# Patient Record
Sex: MALE | Race: WHITE | Employment: UNEMPLOYED | URBAN - METROPOLITAN AREA
[De-identification: names, ages, dates, MRNs, and addresses within clinical notes are randomized per-mention and may not be internally consistent; named-entity substitution may affect disease eponyms.]

---

## 2017-06-27 ENCOUNTER — HOSPITAL ENCOUNTER (OUTPATIENT)
Age: 27
Discharge: HOME | End: 2017-06-27
Payer: MEDICAID

## 2017-06-27 VITALS
HEART RATE: 63 BPM | OXYGEN SATURATION: 97 % | SYSTOLIC BLOOD PRESSURE: 123 MMHG | RESPIRATION RATE: 18 BRPM | DIASTOLIC BLOOD PRESSURE: 60 MMHG | TEMPERATURE: 99 F

## 2017-06-27 DIAGNOSIS — H61.22 IMPACTED CERUMEN OF LEFT EAR: ICD-10-CM

## 2017-06-27 DIAGNOSIS — H60.502 ACUTE OTITIS EXTERNA OF LEFT EAR, UNSPECIFIED TYPE: Primary | ICD-10-CM

## 2017-06-27 PROCEDURE — 99214 OFFICE O/P EST MOD 30 MIN: CPT

## 2017-06-27 PROCEDURE — 69210 REMOVE IMPACTED EAR WAX UNI: CPT

## 2017-06-27 PROCEDURE — 99213 OFFICE O/P EST LOW 20 MIN: CPT

## 2017-06-27 RX ORDER — CIPROFLOXACIN AND DEXAMETHASONE 3; 1 MG/ML; MG/ML
4 SUSPENSION/ DROPS AURICULAR (OTIC) 2 TIMES DAILY
Qty: 1 BOTTLE | Refills: 0 | Status: SHIPPED | OUTPATIENT
Start: 2017-06-27 | End: 2017-07-04

## 2017-06-27 NOTE — ED PROVIDER NOTES
Patient Seen in: THE [facility] END    History   Patient presents with:  Ear Wax    Stated Complaint: ear clogged x 3 days    HPI    Sheldon Dewey is a 19-year-old male who comes in today complaining of 4 days of left ear clogged sensation.   He watery discharge; no sinus tenderness  Throat:  Lips, tongue, and mucosa are moist, pink, and intact; posterior pharynx without exudate or erythema. No trismus or stridor, uvula midline.    Neck:  Supple; symmetrical, trachea midline, no adenopathy  Lungs: into the left ear 2 (two) times daily.   Qty: 1 Bottle Refills: 0  Associated Diagnoses:Acute otitis externa of left ear, unspecified type      I have given the patient instructions regarding his diagnosis, expectations, follow up, and return to the ER prec

## 2017-06-28 NOTE — ED NOTES
Received phone call from [facility] stating Ciprodex not covered by insurance. Cortisporin is covered. Dr Tyrell Roberts informed and medication was changed.

## 2020-11-09 PROBLEM — F32.A DEPRESSIVE DISORDER: Status: ACTIVE | Noted: 2019-09-27

## 2021-01-29 ENCOUNTER — LAB ENCOUNTER (OUTPATIENT)
Dept: LAB | Age: 31
End: 2021-01-29
Attending: INTERNAL MEDICINE
Payer: COMMERCIAL

## 2021-01-29 DIAGNOSIS — J06.9 URI WITH COUGH AND CONGESTION: ICD-10-CM

## 2021-01-30 LAB — SARS-COV-2 RNA RESP QL NAA+PROBE: NOT DETECTED

## 2021-11-17 NOTE — ED INITIAL ASSESSMENT (HPI)
C/O left ear feels clogged for 3 days. Instilled OTC ear wax softener-no relief. Azathioprine Counseling:  I discussed with the patient the risks of azathioprine including but not limited to myelosuppression, immunosuppression, hepatotoxicity, lymphoma, and infections.  The patient understands that monitoring is required including baseline LFTs, Creatinine, possible TPMP genotyping and weekly CBCs for the first month and then every 2 weeks thereafter.  The patient verbalized understanding of the proper use and possible adverse effects of azathioprine.  All of the patient's questions and concerns were addressed.

## 2022-09-01 ENCOUNTER — APPOINTMENT (OUTPATIENT)
Dept: URBAN - METROPOLITAN AREA CLINIC 242 | Age: 32
Setting detail: DERMATOLOGY
End: 2022-09-01

## 2022-09-01 DIAGNOSIS — L30.4 ERYTHEMA INTERTRIGO: ICD-10-CM

## 2022-09-01 PROCEDURE — OTHER COUNSELING: OTHER

## 2022-09-01 PROCEDURE — OTHER PRESCRIPTION: OTHER

## 2022-09-01 PROCEDURE — OTHER PRESCRIPTION MEDICATION MANAGEMENT: OTHER

## 2022-09-01 PROCEDURE — 99213 OFFICE O/P EST LOW 20 MIN: CPT

## 2022-09-01 RX ORDER — KETOCONAZOLE 20 MG/G
CREAM TOPICAL
Qty: 15 | Refills: 0 | Status: ERX | COMMUNITY
Start: 2022-09-01

## 2022-09-01 RX ORDER — TRIAMCINOLONE ACETONIDE 1 MG/G
CREAM TOPICAL
Qty: 15 | Refills: 0 | Status: ERX | COMMUNITY
Start: 2022-09-01

## 2022-09-01 ASSESSMENT — LOCATION ZONE DERM: LOCATION ZONE: LIP

## 2022-09-01 ASSESSMENT — LOCATION SIMPLE DESCRIPTION DERM: LOCATION SIMPLE: RIGHT LIP

## 2022-09-01 ASSESSMENT — LOCATION DETAILED DESCRIPTION DERM: LOCATION DETAILED: RIGHT NASOLABIAL FOLD

## 2022-09-01 NOTE — PROCEDURE: PRESCRIPTION MEDICATION MANAGEMENT
Render In Strict Bullet Format?: No
Initiate Treatment: TAC bid, Ketoconazole cream bid
Detail Level: Zone

## 2023-07-19 ENCOUNTER — APPOINTMENT (RX ONLY)
Dept: URBAN - METROPOLITAN AREA CLINIC 330 | Facility: CLINIC | Age: 33
Setting detail: DERMATOLOGY
End: 2023-07-19

## 2023-07-19 DIAGNOSIS — K13.0 DISEASES OF LIPS: ICD-10-CM | Status: IMPROVED

## 2023-07-19 DIAGNOSIS — L21.8 OTHER SEBORRHEIC DERMATITIS: ICD-10-CM

## 2023-07-19 DIAGNOSIS — L259 CONTACT DERMATITIS AND OTHER ECZEMA, UNSPECIFIED CAUSE: ICD-10-CM

## 2023-07-19 PROBLEM — L30.8 OTHER SPECIFIED DERMATITIS: Status: ACTIVE | Noted: 2023-07-19

## 2023-07-19 PROCEDURE — ? PRESCRIPTION MEDICATION MANAGEMENT

## 2023-07-19 PROCEDURE — ? PRESCRIPTION

## 2023-07-19 PROCEDURE — 99204 OFFICE O/P NEW MOD 45 MIN: CPT

## 2023-07-19 PROCEDURE — ? MEDICATION COUNSELING

## 2023-07-19 PROCEDURE — ? COUNSELING

## 2023-07-19 RX ORDER — PIMECROLIMUS 10 MG/G
CREAM TOPICAL
Qty: 30 | Refills: 2 | Status: ERX | COMMUNITY
Start: 2023-07-19

## 2023-07-19 RX ORDER — KETOCONAZOLE 20 MG/G
CREAM TOPICAL
Qty: 30 | Refills: 3 | Status: ERX | COMMUNITY
Start: 2023-07-19

## 2023-07-19 RX ORDER — TRIAMCINOLONE ACETONIDE 1 MG/G
CREAM TOPICAL BID
Qty: 80 | Refills: 2 | Status: ERX | COMMUNITY
Start: 2023-07-19

## 2023-07-19 RX ADMIN — PIMECROLIMUS: 10 CREAM TOPICAL at 00:00

## 2023-07-19 RX ADMIN — KETOCONAZOLE: 20 CREAM TOPICAL at 00:00

## 2023-07-19 RX ADMIN — TRIAMCINOLONE ACETONIDE: 1 CREAM TOPICAL at 00:00

## 2023-07-19 ASSESSMENT — LOCATION ZONE DERM
LOCATION ZONE: FACE
LOCATION ZONE: ARM
LOCATION ZONE: LIP

## 2023-07-19 ASSESSMENT — LOCATION DETAILED DESCRIPTION DERM
LOCATION DETAILED: LEFT INFERIOR VERMILION LIP
LOCATION DETAILED: LEFT SUPERIOR VERMILION LIP
LOCATION DETAILED: RIGHT SUPERIOR MEDIAL BUCCAL CHEEK
LOCATION DETAILED: LEFT VENTRAL PROXIMAL FOREARM

## 2023-07-19 ASSESSMENT — SEVERITY ASSESSMENT 2020: SEVERITY 2020: MILD

## 2023-07-19 ASSESSMENT — LOCATION SIMPLE DESCRIPTION DERM
LOCATION SIMPLE: LEFT LIP
LOCATION SIMPLE: LEFT FOREARM
LOCATION SIMPLE: RIGHT CHEEK

## 2023-07-19 ASSESSMENT — SEVERITY ASSESSMENT: HOW SEVERE IS THIS PATIENT'S CONDITION?: MILD

## 2023-07-19 NOTE — PROCEDURE: PRESCRIPTION MEDICATION MANAGEMENT
Render In Strict Bullet Format?: No
Initiate Treatment: Mix Ketoconazole/ Tmc cream 50/50 and apply to lips bid for two weeks, use only as needed when cracked and irritated
Detail Level: Zone
Plan: Pt only needs to use creams as needed\\nPt and mom advised that this condition can be caused by a multitude of things\\nLast flare several weeks ago. Clear today.
Plan: Discussed chronic condition that we can help manage.
Initiate Treatment: Ketoconazole cream bid prn \\nElidel cream bid prn
Detail Level: Simple
Initiate Treatment: Can use Tmc if needed

## 2025-01-14 SDOH — HEALTH STABILITY: PHYSICAL HEALTH: ON AVERAGE, HOW MANY DAYS PER WEEK DO YOU ENGAGE IN MODERATE TO STRENUOUS EXERCISE (LIKE A BRISK WALK)?: 4 DAYS

## 2025-01-14 SDOH — HEALTH STABILITY: PHYSICAL HEALTH: ON AVERAGE, HOW MANY MINUTES DO YOU ENGAGE IN EXERCISE AT THIS LEVEL?: 50 MIN

## 2025-01-15 ENCOUNTER — OFFICE VISIT (OUTPATIENT)
Dept: PRIMARY CARE CLINIC | Facility: CLINIC | Age: 35
End: 2025-01-15
Payer: COMMERCIAL

## 2025-01-15 VITALS
WEIGHT: 168 LBS | HEIGHT: 68 IN | TEMPERATURE: 98 F | HEART RATE: 85 BPM | DIASTOLIC BLOOD PRESSURE: 74 MMHG | BODY MASS INDEX: 25.46 KG/M2 | SYSTOLIC BLOOD PRESSURE: 118 MMHG | OXYGEN SATURATION: 98 % | RESPIRATION RATE: 16 BRPM

## 2025-01-15 DIAGNOSIS — N50.82 ACUTE PAIN IN SCROTUM: Primary | ICD-10-CM

## 2025-01-15 DIAGNOSIS — N50.82 ACUTE PAIN IN SCROTUM: ICD-10-CM

## 2025-01-15 LAB
ALBUMIN SERPL-MCNC: 4.7 G/DL (ref 3.5–5)
ALBUMIN/GLOB SERPL: 1.7 (ref 1–1.9)
ALP SERPL-CCNC: 61 U/L (ref 40–129)
ALT SERPL-CCNC: 22 U/L (ref 8–55)
ANION GAP SERPL CALC-SCNC: 11 MMOL/L (ref 7–16)
APPEARANCE UR: CLEAR
AST SERPL-CCNC: 20 U/L (ref 15–37)
BACTERIA URNS QL MICRO: NEGATIVE /HPF
BASOPHILS # BLD: 0.05 K/UL (ref 0–0.2)
BASOPHILS NFR BLD: 1 % (ref 0–2)
BILIRUB SERPL-MCNC: 0.6 MG/DL (ref 0–1.2)
BILIRUB UR QL: NEGATIVE
BUN SERPL-MCNC: 14 MG/DL (ref 6–23)
CALCIUM SERPL-MCNC: 9.9 MG/DL (ref 8.8–10.2)
CASTS URNS QL MICRO: 0 /LPF
CHLORIDE SERPL-SCNC: 101 MMOL/L (ref 98–107)
CO2 SERPL-SCNC: 29 MMOL/L (ref 20–29)
COLOR UR: NORMAL
CREAT SERPL-MCNC: 0.81 MG/DL (ref 0.8–1.3)
CRYSTALS URNS QL MICRO: 0 /LPF
DIFFERENTIAL METHOD BLD: NORMAL
EOSINOPHIL # BLD: 0.15 K/UL (ref 0–0.8)
EOSINOPHIL NFR BLD: 2.9 % (ref 0.5–7.8)
EPI CELLS #/AREA URNS HPF: NORMAL /HPF (ref 0–5)
ERYTHROCYTE [DISTWIDTH] IN BLOOD BY AUTOMATED COUNT: 12.2 % (ref 11.9–14.6)
GLOBULIN SER CALC-MCNC: 2.7 G/DL (ref 2.3–3.5)
GLUCOSE SERPL-MCNC: 91 MG/DL (ref 70–99)
GLUCOSE UR STRIP.AUTO-MCNC: NEGATIVE MG/DL
HCT VFR BLD AUTO: 47.4 % (ref 41.1–50.3)
HGB BLD-MCNC: 16 G/DL (ref 13.6–17.2)
HGB UR QL STRIP: NEGATIVE
HYALINE CASTS URNS QL MICRO: NORMAL /LPF
IMM GRANULOCYTES # BLD AUTO: 0.01 K/UL (ref 0–0.5)
IMM GRANULOCYTES NFR BLD AUTO: 0.2 % (ref 0–5)
KETONES UR QL STRIP.AUTO: NEGATIVE MG/DL
LEUKOCYTE ESTERASE UR QL STRIP.AUTO: NEGATIVE
LYMPHOCYTES # BLD: 1.37 K/UL (ref 0.5–4.6)
LYMPHOCYTES NFR BLD: 26 % (ref 13–44)
MCH RBC QN AUTO: 31.3 PG (ref 26.1–32.9)
MCHC RBC AUTO-ENTMCNC: 33.8 G/DL (ref 31.4–35)
MCV RBC AUTO: 92.6 FL (ref 82–102)
MONOCYTES # BLD: 0.47 K/UL (ref 0.1–1.3)
MONOCYTES NFR BLD: 8.9 % (ref 4–12)
MUCOUS THREADS URNS QL MICRO: 0 /LPF
NEUTS SEG # BLD: 3.21 K/UL (ref 1.7–8.2)
NEUTS SEG NFR BLD: 61 % (ref 43–78)
NITRITE UR QL STRIP.AUTO: NEGATIVE
NRBC # BLD: 0 K/UL (ref 0–0.2)
PH UR STRIP: 5.5 (ref 5–9)
PLATELET # BLD AUTO: 227 K/UL (ref 150–450)
PMV BLD AUTO: 11 FL (ref 9.4–12.3)
POTASSIUM SERPL-SCNC: 4.6 MMOL/L (ref 3.5–5.1)
PROT SERPL-MCNC: 7.4 G/DL (ref 6.3–8.2)
PROT UR STRIP-MCNC: NEGATIVE MG/DL
RBC # BLD AUTO: 5.12 M/UL (ref 4.23–5.6)
RBC #/AREA URNS HPF: NORMAL /HPF (ref 0–5)
SODIUM SERPL-SCNC: 141 MMOL/L (ref 136–145)
SP GR UR REFRACTOMETRY: 1.01 (ref 1–1.02)
URINE CULTURE IF INDICATED: NORMAL
UROBILINOGEN UR QL STRIP.AUTO: 0.2 EU/DL (ref 0.2–1)
WBC # BLD AUTO: 5.3 K/UL (ref 4.3–11.1)
WBC URNS QL MICRO: NORMAL /HPF (ref 0–4)

## 2025-01-15 PROCEDURE — 99203 OFFICE O/P NEW LOW 30 MIN: CPT | Performed by: NURSE PRACTITIONER

## 2025-01-15 SDOH — ECONOMIC STABILITY: FOOD INSECURITY: WITHIN THE PAST 12 MONTHS, YOU WORRIED THAT YOUR FOOD WOULD RUN OUT BEFORE YOU GOT MONEY TO BUY MORE.: NEVER TRUE

## 2025-01-15 SDOH — ECONOMIC STABILITY: FOOD INSECURITY: WITHIN THE PAST 12 MONTHS, THE FOOD YOU BOUGHT JUST DIDN'T LAST AND YOU DIDN'T HAVE MONEY TO GET MORE.: NEVER TRUE

## 2025-01-15 ASSESSMENT — PATIENT HEALTH QUESTIONNAIRE - PHQ9
SUM OF ALL RESPONSES TO PHQ QUESTIONS 1-9: 0
2. FEELING DOWN, DEPRESSED OR HOPELESS: NOT AT ALL
1. LITTLE INTEREST OR PLEASURE IN DOING THINGS: NOT AT ALL
SUM OF ALL RESPONSES TO PHQ9 QUESTIONS 1 & 2: 0
SUM OF ALL RESPONSES TO PHQ QUESTIONS 1-9: 0

## 2025-01-15 NOTE — PROGRESS NOTES
Verified:   Current meds/immunizations reviewed, including purpose with patient. Medication Reconciliation list given to patient/family. Patient advised to discard old medication lists and provide all providers with current list at each visit and carry list with them in case of emergency.    Return in about 3 months (around 4/15/2025) for Annual PE.        JNEAE Sellers - CNP    Please forgive punctuation, typographic/voice recognition errors. Portions of this note were created using voice recognition software. Despite my efforts to edit grammatical and transcription errors, bizarre and nonsensical sentences may still exist.

## 2025-01-15 NOTE — ASSESSMENT & PLAN NOTE
patient has requested referral to urology.  Advised that we will proceed with workup and refer if indicated.  Ultrasound ordered.  Labs today.  May require referral if unable to find source of pain

## 2025-01-16 ENCOUNTER — TELEPHONE (OUTPATIENT)
Dept: PRIMARY CARE CLINIC | Facility: CLINIC | Age: 35
End: 2025-01-16

## 2025-01-16 LAB
C TRACH RRNA SPEC QL NAA+PROBE: NEGATIVE
N GONORRHOEA RRNA SPEC QL NAA+PROBE: NEGATIVE
SPECIMEN SOURCE: NORMAL
T VAGINALIS RRNA SPEC QL NAA+PROBE: NEGATIVE

## 2025-01-16 NOTE — RESULT ENCOUNTER NOTE
Please advise Vick that his labs are healthy at this time.  Any labs not addressed that are outside the normal range are not clinically important at this time.   Continue current treatment plan, follow up as scheduled.   Thanks,   Grzegorz Herrera

## 2025-01-16 NOTE — TELEPHONE ENCOUNTER
----- Message from Grzegorz Herrera, JENAE - CNP sent at 1/16/2025  8:32 AM EST -----  Please advise Vick that his labs are healthy at this time.  Any labs not addressed that are outside the normal range are not clinically important at this time.   Continue current treatment plan, follow up as scheduled.   Thanks,   Grzegorz Herrera

## 2025-01-16 NOTE — TELEPHONE ENCOUNTER
Left VM for the patient to call back for his results, I also let him know. I will be sending him a INMAN message about is lab results.

## 2025-01-17 LAB — T PALLIDUM AB SER QL AGGL: NON REACTIVE

## 2025-01-27 ENCOUNTER — TELEPHONE (OUTPATIENT)
Dept: PRIMARY CARE CLINIC | Facility: CLINIC | Age: 35
End: 2025-01-27

## 2025-01-27 NOTE — TELEPHONE ENCOUNTER
----- Message from Deborah GORDON sent at 1/27/2025  2:06 PM EST -----  Regarding: E[facility] Referral Request  E[facility] Referral Request    Reason for referral request: Specialty Provider    Specialist/Lab/Test patient is requesting (if known):    Specialist Phone Number (if applicable):    Additional Information tendonitis on thumb, right hand dominant hand  --------------------------------------------------------------------------------------------------------------------------    Relationship to Patient: Self     Call Back Information: OK to leave message on voicemail  Preferred Call Back Number: [phone]

## 2025-01-28 ENCOUNTER — TELEPHONE (OUTPATIENT)
Dept: PRIMARY CARE CLINIC | Facility: CLINIC | Age: 35
End: 2025-01-28

## 2025-01-28 DIAGNOSIS — N50.3 CYST OF EPIDIDYMIS DETERMINED BY ULTRASOUND: Primary | ICD-10-CM

## 2025-01-28 NOTE — TELEPHONE ENCOUNTER
----- Message from JENAE Sellers - CNP sent at 1/28/2025  7:49 AM EST -----  Please reach out to Vick let him know that we have the results from his ultrasound.  While there were no acute abnormalities it did show epididymal head cysts.  Because this is causing him discomfort I have placed a referral to urology.  They will r  each out to him to schedule an office visit to discuss treatment options.  Thank you, PALOMA

## 2025-01-28 NOTE — TELEPHONE ENCOUNTER
Spoke with the patient about his US results and let him know. A referral for Urology has been ordered for him, the patient also wanted to know if he could get a referral to PT. 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 I let him know I would send a message to his PCP and get back to him with what he needs to do.

## 2025-01-29 ENCOUNTER — TELEMEDICINE (OUTPATIENT)
Dept: PRIMARY CARE CLINIC | Facility: CLINIC | Age: 35
End: 2025-01-29
Payer: COMMERCIAL

## 2025-01-29 DIAGNOSIS — M65.949 TENOSYNOVITIS OF THUMB: Primary | ICD-10-CM

## 2025-01-29 PROBLEM — F32.A DEPRESSIVE DISORDER: Status: ACTIVE | Noted: 2019-09-27

## 2025-01-29 PROCEDURE — 99213 OFFICE O/P EST LOW 20 MIN: CPT | Performed by: NURSE PRACTITIONER

## 2025-01-29 RX ORDER — KETOCONAZOLE 20 MG/G
CREAM TOPICAL
COMMUNITY

## 2025-01-29 RX ORDER — TRETINOIN 0.25 MG/G
CREAM TOPICAL
COMMUNITY

## 2025-01-29 RX ORDER — CLINDAMYCIN PHOSPHATE 10 MG/G
GEL TOPICAL
COMMUNITY

## 2025-01-29 RX ORDER — PSEUDOEPHEDRINE HCL 30 MG
TABLET ORAL
COMMUNITY

## 2025-01-29 RX ORDER — PREDNISONE 10 MG/1
TABLET ORAL
Qty: 18 TABLET | Refills: 0 | Status: SHIPPED | OUTPATIENT
Start: 2025-01-29 | End: 2025-02-07

## 2025-01-29 ASSESSMENT — ENCOUNTER SYMPTOMS: RESPIRATORY NEGATIVE: 1

## 2025-01-29 NOTE — PROGRESS NOTES
Vick Tabares, was evaluated through a synchronous (real-time) audio-video encounter. The patient (or guardian if applicable) is aware that this is a billable service, which includes applicable co-pays. This Virtual Visit was conducted with patient's (and/or legal guardian's) consent. Patient identification was verified, and a caregiver was present when appropriate.   The patient was located at Home: [address] [address]  Provider was located at Facility (Appt Dept): [address] [place] [address]  Confirm you are appropriately licensed, registered, or certified to deliver care in the state where the patient is located as indicated above. If you are not or unsure, please re-schedule the visit: Yes, I confirm.     Vick Tabares (:  1990) is a Established patient, presenting virtually for evaluation of the following:      Below is the assessment and plan developed based on review of pertinent history, physical exam, labs, studies, and medications.     Assessment & Plan  Tenosynovitis of thumb    Referral placed for physical therapy.  Most likely will need referral to hand surgery.  Begin prednisone to decrease inflammation.  Rx sent.  Patient vies OTC topical agents such as Voltaren and IcyHot are helpful.    Orders:    predniSONE (DELTASONE) 10 MG tablet; Take 3 tablets by mouth daily for 3 days, THEN 2 tablets daily for 3 days, THEN 1 tablet daily for 3 days.    BS - Physical Therapy, [facility]      Return if symptoms worsen or fail to improve.       Subjective   Vick Tabares is a 35 y.o. male who presents virtually today requesting referral for physical therapy for his right thumb pain.  States that he has been having issues with the right thumb and wrist pain for several years.  He states that it is finally reaching a point where it is impeding his ability to do work.  When he sits at a computer and types he experiences a shooting pain in his thumb

## 2025-01-29 NOTE — ASSESSMENT & PLAN NOTE
Referral placed for physical therapy.  Most likely will need referral to hand surgery.  Begin prednisone to decrease inflammation.  Rx sent.  Patient vies OTC topical agents such as Voltaren and IcyHot are helpful.    Orders:    predniSONE (DELTASONE) 10 MG tablet; Take 3 tablets by mouth daily for 3 days, THEN 2 tablets daily for 3 days, THEN 1 tablet daily for 3 days.    BS - Physical Therapy, [facility]

## 2025-02-05 DIAGNOSIS — M65.949 TENOSYNOVITIS OF THUMB: Primary | ICD-10-CM

## 2025-02-07 ENCOUNTER — EVALUATION (OUTPATIENT)
Age: 35
End: 2025-02-07

## 2025-02-07 DIAGNOSIS — R29.898 DECREASED GRIP STRENGTH: ICD-10-CM

## 2025-02-07 DIAGNOSIS — M25.531 RIGHT WRIST PAIN: ICD-10-CM

## 2025-02-07 DIAGNOSIS — M65.949 TENOSYNOVITIS OF THUMB: Primary | ICD-10-CM

## 2025-02-07 NOTE — PROGRESS NOTES
[facility]  [address] [place] [address]  Dept: [phone]      Occupational Therapy Initial Assessment     Referring MD: Grzegorz Herrera APRN - *    Diagnosis:     ICD-10-CM    1. Tenosynovitis of thumb  M65.949       2. Right wrist pain  M25.531       3. Decreased  strength  R29.898            Medical Dx:    R thumb/wrist tenosynovitis   Date of referral: 2/5/25      Therapy precautions: None    History of injury/onset : Reports approx 4 to 5 years ago fell off approx 5 foot ledge and landed on his hands.  Reports was casted for 3 months after due to torn TFCC.  Reports he overexercised after this cast was removed, and also due to spending several hours per day nahid and working on his computer, has developed Right thumb and wrist pain    Payor: Payor: [facility] /  /  /  Billing pattern: government  Total Direct Treatment Time: 25 min  Total In Office Time: 60 min  Modifier needed: No  Episode visit count:  1     Preferred Name: Vick    PERTINENT MEDICAL HISTORY     PMHX & Meds: No past medical history on file., No past surgical history on file.   Medications. : Reviewed in chart  Allergies: No Known Allergies     SUBJECTIVE     Current Symptoms/Chief complaints:   Chief Complaint   Patient presents with    Wrist Pain    thumb pain       Chief complaint/history of injury:     Number of Therapy Visits within past 90 days: 0  Nature of condition: Chronic (continuous duration > 3 months)  Primary cause of current episode: Repetitive  Date symptoms began: approx 5 years ago  How did symptoms start: Reports approx 4 to 5 years ago fell off approx 5 foot ledge and landed on his hands.  Reports was casted for 3 months after due to torn TFCC.  Reports he overexercised after this cast was removed, and also due to spending several hours per day nahid and working on his computer, has developed Right thumb and wrist pain, mousing texting and computer use worsens

## 2025-02-14 ENCOUNTER — TREATMENT (OUTPATIENT)
Age: 35
End: 2025-02-14

## 2025-02-14 DIAGNOSIS — R29.898 DECREASED GRIP STRENGTH: ICD-10-CM

## 2025-02-14 DIAGNOSIS — M25.531 RIGHT WRIST PAIN: ICD-10-CM

## 2025-02-14 DIAGNOSIS — M65.949 TENOSYNOVITIS OF THUMB: Primary | ICD-10-CM

## 2025-02-14 PROCEDURE — MISCDN1 MISCDN1: Performed by: OCCUPATIONAL THERAPIST

## 2025-02-14 NOTE — PROGRESS NOTES
[facility]  [address] [address]  Dept: [phone]   Time/Billing     Payor: Payor: [facility] /  /  /  Billing pattern: Government- total time    Approved Visits:  Episode visit count:  2     CPT /   Modifier needed: No   UNITS Treatment Time (1:1)  Total Time    (in office)          Therapeutic Exercise 05238  Manual Therapy 86551  Self Care / Home Management 07847  Fluido Therapy/ [place] 54414  Ultrasound 74660  Dry Needling 1-2 muscles 93541/ MISCDN1 2      1      1  -  1  1     Totals   Charge Capture 6 68 minutes 75 minutes       Occupational Therapy Daily Note     Referring MD: No ref. provider found    Diagnosis:     ICD-10-CM    1. Tenosynovitis of thumb  M65.949       2. Right wrist pain  M25.531       3. Decreased  strength  R29.898            Medical Dx:    R thumb/wrist tenosynovitis   Date of referral: 2/5/25      Therapy precautions: None    History of injury/onset : Reports approx 4 to 5 years ago fell off approx 5 foot ledge and landed on his hands.  Reports was casted for 3 months after due to torn TFCC.  Reports he overexercised after this cast was removed, and also due to spending several hours per day nahid and working on his computer, has developed Right thumb and wrist pain  Preferred Name: Vick    SUBJECTIVE     Current Symptoms/Chief complaints:   Chief Complaint   Patient presents with    Wrist Pain     Reports pain present with mousing.    OBJECTIVE     Functional Outcome Measures: Quick Dash  29 score=   40 % functional deficit  Palpation: TTP FCR tendon insertion/ ECRL ECRB dorsoradial wrist       Forearm/Wrist A/PROM RIGHT  IE LEFT  IE     Pronation/Supination WNLs      Wrist Extension/Flexion 55/82°  60/80     RD/UD 18/40°  °        Thumb A/PROM: RIGHT  IE LEFT  IE     MP ext/flex       IP ext/flex       Radial add/abduction       Palmar add/abduction       Opposition (Talib): 9 10         /Pinch 
Modifications/Assistance required : Verbal cues and Physical cues  Degree of assistance provided: moderate    Treatment:  Initial Evaluation: clinical decision making: Moderate Complexity OT Evaluation -44707     Fluidotherapy (38395) Therapist directed exercise in Fluidotherapy to right hand/wrist for preparation for tx and desensitization     Therapeutic exercise (43112) x 25 min:  Home Exercise Program development and Education: see below.  Patient I with program following instruction and performance  Use of heat and ice as needed for pain and inflammation reduction. Precautions reviewed.  OT POC and rationale, education for surgical precautions and pain management, edema management  Reviewed use of heat and CFM to tendons  Fabricated small low profile radial based thumb spica in position of rest as pt reports he feels he hold thumb in hyperextended position while using touch screen (has switched to touch screen as unable to use mouse for work due to pain    CLINICAL DECISION MAKING/ASSESSMENT     Performance Deficits  Physical: Dexterity, Mobility, Strength, and Fine motor coordination  Cognitive: No deficiencies noted  Psychosocial: No deficiencies noted  Rehab potential: good    The patient presents today with R thumb and wrist pain 2nd to tenosynovitis .  The patient presents with thumb wrist pain and weakness .     After a expanded chart/PMH and OT profile review, evaluation requiring moderate assistance/modifications and involved patient and data analysis, I have determined the patient exhibits several (1-3) performance deficits. Comorbidities may affect the patient's occupational performance. The following performance deficits (including but not limited to physical, cognitive and/or psychosocial components) result in activity limitations and participation restrictions: Dexterity, Mobility, Strength, and Fine motor coordination    The patient would benefit from skilled occupational therapy services to

## 2025-02-21 ENCOUNTER — TREATMENT (OUTPATIENT)
Age: 35
End: 2025-02-21
Payer: COMMERCIAL

## 2025-02-21 DIAGNOSIS — M25.531 RIGHT WRIST PAIN: Primary | ICD-10-CM

## 2025-02-21 DIAGNOSIS — R29.898 DECREASED GRIP STRENGTH: ICD-10-CM

## 2025-02-21 PROCEDURE — 97110 THERAPEUTIC EXERCISES: CPT | Performed by: OCCUPATIONAL THERAPIST

## 2025-02-21 PROCEDURE — 97140 MANUAL THERAPY 1/> REGIONS: CPT | Performed by: OCCUPATIONAL THERAPIST

## 2025-02-21 NOTE — PROGRESS NOTES
[facility]  [address] [address]  Dept: [phone]   Time/Billing     Payor: Payor: [facility] /  /  /  Billing pattern: Government- total time    Approved Visits:  Episode visit count:  3     CPT /   Modifier needed: No   UNITS Treatment Time (1:1)  Total Time    (in office)          Therapeutic Exercise 63198  Manual Therapy 93422  Self Care / Home Management 29360  Fluido Therapy/ [place] 86812  Ultrasound 76643  Dry Needling 1-2 muscles 71801/ MISCDN1 2 25     1 10     -  -  0  -     8    Totals   Charge Capture 3 43 minutes 50 minutes       Occupational Therapy Daily Note     Referring MD: Grzegorz Herrera APRN - *    Diagnosis:     ICD-10-CM    1. Right wrist pain  M25.531       2. Decreased  strength  R29.898              Medical Dx:    R thumb/wrist tenosynovitis   Date of referral: 2/5/25      Therapy precautions: None    History of injury/onset : Reports approx 4 to 5 years ago fell off approx 5 foot ledge and landed on his hands.  Reports was casted for 3 months after due to torn TFCC.  Reports he overexercised after this cast was removed, and also due to spending several hours per day nahid and working on his computer, has developed Right thumb and wrist pain  Preferred Name: Vick    SUBJECTIVE     Current Symptoms/Chief complaints:   Chief Complaint   Patient presents with    Wrist Pain     States he feels that he is slightly improved after last session.  OBJECTIVE     Functional Outcome Measures: Quick Dash  29 score=   40 % functional deficit  Palpation: TTP FCR tendon insertion/ ECRL ECRB dorsoradial wrist       Forearm/Wrist A/PROM RIGHT  IE LEFT  IE     Pronation/Supination WNLs      Wrist Extension/Flexion 55/82°  60/80     RD/UD 18/40°  °        Thumb A/PROM: RIGHT  IE LEFT  IE     MP ext/flex       IP ext/flex       Radial add/abduction       Palmar add/abduction       Opposition (Talib): 9 10         /Pinch

## 2025-02-28 ENCOUNTER — TREATMENT (OUTPATIENT)
Age: 35
End: 2025-02-28

## 2025-02-28 ENCOUNTER — OFFICE VISIT (OUTPATIENT)
Dept: UROLOGY | Age: 35
End: 2025-02-28
Payer: COMMERCIAL

## 2025-02-28 DIAGNOSIS — R29.898 DECREASED GRIP STRENGTH: ICD-10-CM

## 2025-02-28 DIAGNOSIS — N50.3 CYST OF EPIDIDYMIS: Primary | ICD-10-CM

## 2025-02-28 DIAGNOSIS — G89.29 CHRONIC PELVIC PAIN IN MALE: ICD-10-CM

## 2025-02-28 DIAGNOSIS — M25.531 RIGHT WRIST PAIN: Primary | ICD-10-CM

## 2025-02-28 DIAGNOSIS — R10.2 CHRONIC PELVIC PAIN IN MALE: ICD-10-CM

## 2025-02-28 LAB
BILIRUBIN, URINE, POC: NEGATIVE
BLOOD URINE, POC: NORMAL
GLUCOSE URINE, POC: NEGATIVE MG/DL
KETONES, URINE, POC: NEGATIVE MG/DL
LEUKOCYTE ESTERASE, URINE, POC: NEGATIVE
NITRITE, URINE, POC: NEGATIVE
PH, URINE, POC: 6.5 (ref 4.6–8)
PROTEIN,URINE, POC: NEGATIVE MG/DL
SPECIFIC GRAVITY, URINE, POC: 1.01 (ref 1–1.03)
URINALYSIS CLARITY, POC: NORMAL
URINALYSIS COLOR, POC: NORMAL
UROBILINOGEN, POC: NORMAL MG/DL

## 2025-02-28 PROCEDURE — 99205 OFFICE O/P NEW HI 60 MIN: CPT | Performed by: UROLOGY

## 2025-02-28 PROCEDURE — 81003 URINALYSIS AUTO W/O SCOPE: CPT | Performed by: UROLOGY

## 2025-02-28 RX ORDER — KETOROLAC TROMETHAMINE 10 MG/1
10 TABLET, FILM COATED ORAL EVERY 6 HOURS PRN
Qty: 20 TABLET | Refills: 0 | Status: SHIPPED | OUTPATIENT
Start: 2025-02-28 | End: 2026-02-28

## 2025-02-28 NOTE — PROGRESS NOTES
[facility]  [address] [address]  Dept: [phone]   Time/Billing     Payor: Payor: Counts include [facility] beds at the [facility] /  /  /  Billing pattern: Government- total time    Approved Visits:  Episode visit count:  4     CPT /   Modifier needed: No   UNITS Treatment Time (1:1)  Total Time    (in office)          Therapeutic Exercise 24221  Manual Therapy 40155  Fluido Therapy/ [place] 91915  [place] 33380  Ultrasound 26662 2 25     1 10     -  1  1     8    Totals   Charge Capture 3 43 minutes 55 minutes       Occupational Therapy Daily Note     Referring MD: Grzegorz Herrera APRN - *    Diagnosis:     ICD-10-CM    1. Right wrist pain  M25.531       2. Decreased  strength  R29.898              Medical Dx:    R thumb/wrist tenosynovitis   Date of referral: 2/5/25      Therapy precautions: None    History of injury/onset : Reports approx 4 to 5 years ago fell off approx 5 foot ledge and landed on his hands.  Reports was casted for 3 months after due to torn TFCC.  Reports he overexercised after this cast was removed, and also due to spending several hours per day nahid and working on his computer, has developed Right thumb and wrist pain  Preferred Name: Vick    SUBJECTIVE     Current Symptoms/Chief complaints:   Chief Complaint   Patient presents with    Hand Pain     Some soreness at the base of the thumb again. He has not obtained ergonomic mouse yet.  OBJECTIVE     Functional Outcome Measures: Quick Dash  29 score=   40 % functional deficit  Palpation: TTP FCR tendon insertion/ ECRL ECRB dorsoradial wrist       Forearm/Wrist A/PROM RIGHT  IE LEFT  IE     Pronation/Supination WNLs      Wrist Extension/Flexion 55/82°  60/80     RD/UD 18/40°  °        Thumb A/PROM: RIGHT  IE LEFT  IE     MP ext/flex       IP ext/flex       Radial add/abduction       Palmar add/abduction       Opposition (Talib): 9 10         /Pinch Strength  Strength (psi): RIGHT  IE LEFT  IE

## 2025-03-06 ENCOUNTER — TREATMENT (OUTPATIENT)
Age: 35
End: 2025-03-06

## 2025-03-06 DIAGNOSIS — R29.898 DECREASED GRIP STRENGTH: ICD-10-CM

## 2025-03-06 DIAGNOSIS — M25.531 RIGHT WRIST PAIN: Primary | ICD-10-CM

## 2025-03-06 NOTE — PROGRESS NOTES
[facility]  [address] [place] [address]  Dept: [phone]   Time/Billing     Payor: Payor: [facility] /  /  /  Billing pattern: Government- total time    Approved Visits:  Episode visit count:  5     CPT /   Modifier needed: No   UNITS Treatment Time (1:1)  Total Time    (in office)          Therapeutic Exercise 50342  Manual Therapy 19612  Fluido Therapy/ [place] 77046  [place] 44709  Ultrasound 78949 2 25     1 10     -  -  1     8    Totals   Charge Capture 3 43 minutes 50 minutes       Occupational Therapy Daily Note     Referring MD: Grzegorz Herrera APRN - *    Diagnosis:     ICD-10-CM    1. Right wrist pain  M25.531       2. Decreased  strength  R29.898                Medical Dx:    R thumb/wrist tenosynovitis   Date of referral: 2/5/25      Therapy precautions: None    History of injury/onset : Reports approx 4 to 5 years ago fell off approx 5 foot ledge and landed on his hands.  Reports was casted for 3 months after due to torn TFCC.  Reports he overexercised after this cast was removed, and also due to spending several hours per day nahid and working on his computer, has developed Right thumb and wrist pain  Preferred Name: Vick    SUBJECTIVE     Current Symptoms/Chief complaints:   Chief Complaint   Patient presents with    Wrist Pain     Some soreness at the base of the thumb again. He has not obtained ergonomic mouse yet.  Still having pain with nahid and use of the mouse pad.    OBJECTIVE     Functional Outcome Measures: Quick Dash  29 score=   40 % functional deficit  Palpation: TTP FCR tendon insertion/ ECRL ECRB dorsoradial wrist       Forearm/Wrist A/PROM RIGHT  IE LEFT  IE     Pronation/Supination WNLs      Wrist Extension/Flexion 55/82°  60/80     RD/UD 18/40°  °        Thumb A/PROM: RIGHT  IE LEFT  IE     MP ext/flex       IP ext/flex       Radial add/abduction       Palmar add/abduction       Opposition (Talib): 9 10

## 2025-03-14 ENCOUNTER — TREATMENT (OUTPATIENT)
Age: 35
End: 2025-03-14

## 2025-03-14 DIAGNOSIS — M25.531 RIGHT WRIST PAIN: Primary | ICD-10-CM

## 2025-03-14 DIAGNOSIS — M65.949 TENOSYNOVITIS OF THUMB: ICD-10-CM

## 2025-03-14 DIAGNOSIS — R29.898 DECREASED GRIP STRENGTH: ICD-10-CM

## 2025-03-21 ENCOUNTER — HOSPITAL ENCOUNTER (OUTPATIENT)
Dept: MRI IMAGING | Age: 35
Discharge: HOME | End: 2025-03-24
Attending: UROLOGY
Payer: COMMERCIAL

## 2025-03-21 DIAGNOSIS — G89.29 CHRONIC PELVIC PAIN IN MALE: ICD-10-CM

## 2025-03-21 DIAGNOSIS — R10.2 CHRONIC PELVIC PAIN IN MALE: ICD-10-CM

## 2025-03-21 PROCEDURE — A9579 GAD-BASE MR CONTRAST NOS,1ML: HCPCS | Performed by: UROLOGY

## 2025-03-21 PROCEDURE — 6360000004 HC RX CONTRAST MEDICATION: Performed by: UROLOGY

## 2025-03-21 PROCEDURE — 72197 MRI PELVIS W/O & W/DYE: CPT

## 2025-03-21 RX ADMIN — GADOTERIDOL 15 ML: 279.3 INJECTION, SOLUTION INTRAVENOUS at 13:47

## 2025-03-27 ENCOUNTER — RESULTS FOLLOW-UP (OUTPATIENT)
Dept: UROLOGY | Age: 35
End: 2025-03-27

## 2025-03-27 NOTE — RESULT ENCOUNTER NOTE
Mr. Held, your MRI scan is normal and does not show an obvious source for your penile / pelvic pain.  It does show the left benign epididymal cyst but this should not be causing your penile / pelvic pain.  I will leave the follow up open ended for now and have you call us if the medications have not helped and symptoms persist after 3 months    Best Regards  Dr. Porter
verbal cues/nonverbal cues (demo/gestures)/1 person assist

## 2025-03-28 ENCOUNTER — TREATMENT (OUTPATIENT)
Age: 35
End: 2025-03-28

## 2025-03-28 DIAGNOSIS — R29.898 DECREASED GRIP STRENGTH: ICD-10-CM

## 2025-03-28 DIAGNOSIS — M25.531 RIGHT WRIST PAIN: Primary | ICD-10-CM

## 2025-03-28 NOTE — PROGRESS NOTES
CFM to tendons  Fabricated small low profile radial based thumb spica in position of rest as pt reports he feels he hold thumb in hyperextended position while using touch screen (has switched to touch screen as unable to use mouse for work due to pain      ASSESSMENT     New pain at intersection area versus base of thumb.  Seems he may be favoring a thumb extended posture unintentionally.  New strap to act as a training device to limit thumb extension/abduction with mousing.  He is using the hand 8 hour days.  Feels pain after work, not while using.  Stated he feels that this new strap may help a lot.    PLAN OF CARE     Continue with modalities for pain/inflammation, to promote healing.  Strengthening and stabilization HEP.  Rest of the thumb with long term nahid and typing, incorporating taping as needed.  Ergonomic mouse for  home use.  Progress to I HEP.      Effective Dates: 2/7/2025 TO 4/8/2025 (60 days).    Frequency/Duration:  1 to 2 x wk  for 60 Day(s)  Interventions may include but are not limited to:   97110-Therapeutic procedure/exercise to develop ROM, strength, endurance and flexibility.  (61001- Manual therapy techniques to improve joint and/or soft tissue mobility, ROM, and function as well as helping to decrease pain/spasms and swelling  97530-Therapeutic activities using dynamic activities to improve function  58769 Initial orthotic management and training: Fabrication/adjustments as indicated  16829-Xvegubtwbs orthotic management utilizing splint repairs and adjustments addressing fit, comfort and positioning  84037- Self-care/home management training to improve activities of daily living skills and compensatory techniques to achieve independence  44746- Neuromuscular reeducation addressing impaired coordination, kinesthetic sense, posture and proprioception  Modalities prn to address pain, spasms, tissue extensibility and swelling:(72792) Ultrasound/phonophoresis  (57390) Hot/cold pack  (00765)

## 2025-04-11 ENCOUNTER — TREATMENT (OUTPATIENT)
Age: 35
End: 2025-04-11
Payer: COMMERCIAL

## 2025-04-11 DIAGNOSIS — M25.531 RIGHT WRIST PAIN: Primary | ICD-10-CM

## 2025-04-11 DIAGNOSIS — R29.898 DECREASED GRIP STRENGTH: ICD-10-CM

## 2025-04-11 PROCEDURE — 97035 APP MDLTY 1+ULTRASOUND EA 15: CPT | Performed by: OCCUPATIONAL THERAPIST

## 2025-04-11 PROCEDURE — 97022 WHIRLPOOL THERAPY: CPT | Performed by: OCCUPATIONAL THERAPIST

## 2025-04-11 PROCEDURE — 97110 THERAPEUTIC EXERCISES: CPT | Performed by: OCCUPATIONAL THERAPIST

## 2025-04-11 PROCEDURE — 97140 MANUAL THERAPY 1/> REGIONS: CPT | Performed by: OCCUPATIONAL THERAPIST

## 2025-04-11 NOTE — PROGRESS NOTES
[facility]  [address] [address]  Dept: [phone]   Time/Billing     Payor: Payor: [facility] /  /  /  Billing pattern: Government- total time    Approved Visits:  Episode visit count:  8     CPT /   Modifier needed: No   UNITS Treatment Time (1:1)  Total Time    (in office)          Therapeutic Exercise 17095  Manual Therapy 31985  Fluido Therapy/ Whirlpool 27136  Paraffin 98794  Ultrasound 23384 1 15     1 15     1  -  1     8    Totals   Charge Capture 4 38 minutes 58 minutes       Occupational Therapy Daily Note     Referring MD: Grzegorz Herrera APRN - *    Diagnosis:     ICD-10-CM    1. Right wrist pain  M25.531       2. Decreased  strength  R29.898       Medical Dx:    R thumb/wrist tenosynovitis   Date of referral: 2/5/25      Therapy precautions: None    History of injury/onset : Reports approx 4 to 5 years ago fell off approx 5 foot ledge and landed on his hands.  Reports was casted for 3 months after due to torn TFCC.  Reports he overexercised after this cast was removed, and also due to spending several hours per day nahid and working on his computer, has developed Right thumb and wrist pain  Preferred Name: Vick    SUBJECTIVE     Current Symptoms/Chief complaints:   Chief Complaint   Patient presents with    Hand Pain    Wrist Pain     Patient reports there was an injury years ago to the hand/thumb.  Still having pain.  OBJECTIVE     Functional Outcome Measures: Quick Dash  29 score=   40 % functional deficit     Strength (psi): RIGHT  IE LEFT  IE RIGHT 4/11/25     Position II elbow flex   50 75     Lat Pinch: 19 21     2pt pinch: 14 16     3pt pinch: 13 17         Treatment:    Fluidotherapy (34491) Therapist directed exercise in Fluidotherapy to right hand/wrist for preparation for tx and desensitization  Manual Therapy (58206): Addressing soft tissue/joint restrictions and swelling of  intrinsics and extrinsics:    STM to the palm

## 2025-04-15 ENCOUNTER — OFFICE VISIT (OUTPATIENT)
Dept: PRIMARY CARE CLINIC | Facility: CLINIC | Age: 35
End: 2025-04-15
Payer: COMMERCIAL

## 2025-04-15 VITALS
RESPIRATION RATE: 16 BRPM | HEART RATE: 89 BPM | OXYGEN SATURATION: 95 % | TEMPERATURE: 98.3 F | BODY MASS INDEX: 28.31 KG/M2 | WEIGHT: 186.8 LBS | DIASTOLIC BLOOD PRESSURE: 72 MMHG | SYSTOLIC BLOOD PRESSURE: 110 MMHG | HEIGHT: 68 IN

## 2025-04-15 DIAGNOSIS — Z11.4 SCREENING FOR HIV WITHOUT PRESENCE OF RISK FACTORS: ICD-10-CM

## 2025-04-15 DIAGNOSIS — Z11.59 NEED FOR HEPATITIS C SCREENING TEST: ICD-10-CM

## 2025-04-15 DIAGNOSIS — Z13.220 ENCOUNTER FOR LIPID SCREENING FOR CARDIOVASCULAR DISEASE: ICD-10-CM

## 2025-04-15 DIAGNOSIS — Z13.6 ENCOUNTER FOR LIPID SCREENING FOR CARDIOVASCULAR DISEASE: ICD-10-CM

## 2025-04-15 DIAGNOSIS — M65.949 TENOSYNOVITIS OF THUMB: ICD-10-CM

## 2025-04-15 DIAGNOSIS — Z11.59 ENCOUNTER FOR SCREENING FOR VIRAL DISEASE: ICD-10-CM

## 2025-04-15 DIAGNOSIS — E55.9 VITAMIN D INSUFFICIENCY: ICD-10-CM

## 2025-04-15 DIAGNOSIS — Z00.00 ANNUAL PHYSICAL EXAM: Primary | ICD-10-CM

## 2025-04-15 DIAGNOSIS — Z13.1 SCREENING FOR DIABETES MELLITUS (DM): ICD-10-CM

## 2025-04-15 DIAGNOSIS — N50.82 ACUTE PAIN IN SCROTUM: ICD-10-CM

## 2025-04-15 LAB
25(OH)D3 SERPL-MCNC: 30.9 NG/ML (ref 30–100)
ALBUMIN SERPL-MCNC: 4.8 G/DL (ref 3.5–5)
ALBUMIN/GLOB SERPL: 1.8 (ref 1–1.9)
ALP SERPL-CCNC: 59 U/L (ref 40–129)
ALT SERPL-CCNC: 42 U/L (ref 8–55)
ANION GAP SERPL CALC-SCNC: 14 MMOL/L (ref 7–16)
APPEARANCE UR: CLEAR
AST SERPL-CCNC: 30 U/L (ref 15–37)
BACTERIA URNS QL MICRO: 0 /HPF
BASOPHILS # BLD: 0.04 K/UL (ref 0–0.2)
BASOPHILS NFR BLD: 0.7 % (ref 0–2)
BILIRUB SERPL-MCNC: 0.7 MG/DL (ref 0–1.2)
BILIRUB UR QL: NEGATIVE
BUN SERPL-MCNC: 13 MG/DL (ref 6–23)
CALCIUM SERPL-MCNC: 9.8 MG/DL (ref 8.8–10.2)
CASTS URNS QL MICRO: 0 /LPF
CHLORIDE SERPL-SCNC: 100 MMOL/L (ref 98–107)
CHOLEST SERPL-MCNC: 199 MG/DL (ref 0–200)
CO2 SERPL-SCNC: 25 MMOL/L (ref 20–29)
COLOR UR: NORMAL
CREAT SERPL-MCNC: 0.84 MG/DL (ref 0.8–1.3)
CRYSTALS URNS QL MICRO: 0 /LPF
DIFFERENTIAL METHOD BLD: NORMAL
EOSINOPHIL # BLD: 0.17 K/UL (ref 0–0.8)
EOSINOPHIL NFR BLD: 3.1 % (ref 0.5–7.8)
EPI CELLS #/AREA URNS HPF: NORMAL /HPF
ERYTHROCYTE [DISTWIDTH] IN BLOOD BY AUTOMATED COUNT: 12.1 % (ref 11.9–14.6)
EST. AVERAGE GLUCOSE BLD GHB EST-MCNC: 96 MG/DL
GLOBULIN SER CALC-MCNC: 2.6 G/DL (ref 2.3–3.5)
GLUCOSE SERPL-MCNC: 100 MG/DL (ref 70–99)
GLUCOSE UR STRIP.AUTO-MCNC: NEGATIVE MG/DL
HBA1C MFR BLD: 5 % (ref 0–5.6)
HBV SURFACE AB SERPL IA-ACNC: 1672 MIU/ML
HCT VFR BLD AUTO: 49 % (ref 41.1–50.3)
HCV AB SER QL: NONREACTIVE
HDLC SERPL-MCNC: 53 MG/DL (ref 40–60)
HDLC SERPL: 3.7 (ref 0–5)
HGB BLD-MCNC: 16.6 G/DL (ref 13.6–17.2)
HGB UR QL STRIP: NEGATIVE
HIV 1+2 AB+HIV1 P24 AG SERPL QL IA: NONREACTIVE
HIV 1/2 RESULT COMMENT: NORMAL
IMM GRANULOCYTES # BLD AUTO: 0.01 K/UL (ref 0–0.5)
IMM GRANULOCYTES NFR BLD AUTO: 0.2 % (ref 0–5)
KETONES UR QL STRIP.AUTO: NEGATIVE MG/DL
LDLC SERPL CALC-MCNC: 124 MG/DL (ref 0–100)
LEUKOCYTE ESTERASE UR QL STRIP.AUTO: NEGATIVE
LYMPHOCYTES # BLD: 1.66 K/UL (ref 0.5–4.6)
LYMPHOCYTES NFR BLD: 30.5 % (ref 13–44)
MCH RBC QN AUTO: 31.6 PG (ref 26.1–32.9)
MCHC RBC AUTO-ENTMCNC: 33.9 G/DL (ref 31.4–35)
MCV RBC AUTO: 93.3 FL (ref 82–102)
MONOCYTES # BLD: 0.41 K/UL (ref 0.1–1.3)
MONOCYTES NFR BLD: 7.5 % (ref 4–12)
MUCOUS THREADS URNS QL MICRO: 0 /LPF
NEUTS SEG # BLD: 3.16 K/UL (ref 1.7–8.2)
NEUTS SEG NFR BLD: 58 % (ref 43–78)
NITRITE UR QL STRIP.AUTO: NEGATIVE
NRBC # BLD: 0 K/UL (ref 0–0.2)
OTHER OBSERVATIONS: NORMAL
PH UR STRIP: 6.5 (ref 5–9)
PLATELET # BLD AUTO: 229 K/UL (ref 150–450)
PMV BLD AUTO: 10.6 FL (ref 9.4–12.3)
POTASSIUM SERPL-SCNC: 4.1 MMOL/L (ref 3.5–5.1)
PROT SERPL-MCNC: 7.4 G/DL (ref 6.3–8.2)
PROT UR STRIP-MCNC: NEGATIVE MG/DL
PSA SERPL-MCNC: 0.6 NG/ML (ref 0–4)
RBC # BLD AUTO: 5.25 M/UL (ref 4.23–5.6)
RBC #/AREA URNS HPF: NORMAL /HPF
SODIUM SERPL-SCNC: 140 MMOL/L (ref 136–145)
SP GR UR REFRACTOMETRY: 1.01 (ref 1–1.02)
TRIGL SERPL-MCNC: 109 MG/DL (ref 0–150)
TSH W FREE THYROID IF ABNORMAL: 1.06 UIU/ML (ref 0.27–4.2)
URINE CULTURE IF INDICATED: NORMAL
UROBILINOGEN UR QL STRIP.AUTO: 0.2 EU/DL (ref 0.2–1)
VLDLC SERPL CALC-MCNC: 22 MG/DL (ref 6–23)
WBC # BLD AUTO: 5.5 K/UL (ref 4.3–11.1)
WBC URNS QL MICRO: NORMAL /HPF

## 2025-04-15 PROCEDURE — 99395 PREV VISIT EST AGE 18-39: CPT | Performed by: NURSE PRACTITIONER

## 2025-04-15 PROCEDURE — 99214 OFFICE O/P EST MOD 30 MIN: CPT | Performed by: NURSE PRACTITIONER

## 2025-04-15 ASSESSMENT — PATIENT HEALTH QUESTIONNAIRE - PHQ9
SUM OF ALL RESPONSES TO PHQ QUESTIONS 1-9: 0
2. FEELING DOWN, DEPRESSED OR HOPELESS: NOT AT ALL
SUM OF ALL RESPONSES TO PHQ QUESTIONS 1-9: 0
1. LITTLE INTEREST OR PLEASURE IN DOING THINGS: NOT AT ALL

## 2025-04-15 NOTE — PROGRESS NOTES
[facility]  Grzegorz Herrera, MSN, APRN, FNP-c  [address] [address]  [phone]    Follow-up and annual Physical     Date of Service: 04/15/25  Primary Care Provider: Grzegorz Herrera APRN - CNP    PROVIDER NOTE    Vick Tabares is a 35 y.o. male who presents for the following issues:  Chief Complaint   Patient presents with    Follow-up    Annual Exam         Subjective       Very pleasant 35-year-old male presents today for follow-up visit for his scrotal pain as well as his right thumb pain.  He is also due for his annual physical and screening labs and we will complete these in office today.  He has visited and had a prostate MRI as well as a pelvic MRI.  The results were i reviewed with the patient.  No acute findings noted on MRI.  He remains in the care of urology for this condition.  If symptoms do not improve he will schedule a follow-up.  Will obtain a diagnostic PSA today as we are drawing labs.    He is in OT for his ongoing right thumb pain.  OT reports some inflammation in treatment for this but does not seem to be responding to his OT.  He would like a referral to a hand surgeon for further evaluation.  Will place this today.        Social History     Tobacco Use    Smoking status: Former     Current packs/day: 0.50     Average packs/day: 0.5 packs/day for 10.0 years (5.0 ttl pk-yrs)     Types: Cigarettes    Smokeless tobacco: Never   Substance Use Topics    Alcohol use: Not Currently     Comment: occas    Drug use: Not Currently       Past Medical History:   Diagnosis Date    Anxiety     Depression         Review of Systems   All other systems reviewed and are negative.      Objective  Visit Vitals  /72 (BP Site: Left Upper Arm, Patient Position: Sitting, BP Cuff Size: Medium Adult)   Pulse 89   Temp 98.3 °F (36.8 °C) (Tympanic)   Resp 16   Ht 1.727 m (5' 8\")   Wt 84.7 kg (186 lb 12.8 oz)   SpO2 95%   BMI 28.40 kg/m²       Body mass

## 2025-04-15 NOTE — ASSESSMENT & PLAN NOTE
Notes reviewed from OT.  Has failed conservative management.  Will place referral to hand surgeon for further evaluation and differential diagnosis

## 2025-04-16 ENCOUNTER — RESULTS FOLLOW-UP (OUTPATIENT)
Dept: PRIMARY CARE CLINIC | Facility: CLINIC | Age: 35
End: 2025-04-16

## 2025-04-16 NOTE — RESULT ENCOUNTER NOTE
Please advise Vick that his labs are healthy at this time. Any labs not addressed that are outside the normal range are not clinically important at this time.   Continue current treatment plan, follow up as scheduled.   Thank you,    Grzegorz Herrera

## 2025-04-21 ENCOUNTER — OFFICE VISIT (OUTPATIENT)
Age: 35
End: 2025-04-21
Payer: COMMERCIAL

## 2025-04-21 DIAGNOSIS — M79.641 RIGHT HAND PAIN: Primary | ICD-10-CM

## 2025-04-21 PROCEDURE — 99204 OFFICE O/P NEW MOD 45 MIN: CPT | Performed by: ORTHOPAEDIC SURGERY

## 2025-04-21 NOTE — PROGRESS NOTES
Orthopedic Hand Surgery Note    Vick Held  1990  male    History of Present Illness  The patient is a new patient to my clinic. He is here for evaluation of right thumb pain. Patient reports that symptoms started 4 years ago when he had a fall onto an outstretched wrist since then, he has had pain on the base of the thumb. He reports that he was trying to do rehab himself and then he caused a TFCC injury on the ulnar side of the wrist that has since improved after wearing a brace, which was prescribed by a physician. He has been doing hand therapy for the past 2 to 3 months without much improvement. Patient reports that the pain is triggered mainly by using computer mouse and video games.    The onset of symptoms was 4 years ago following a fall onto an outstretched wrist, resulting in persistent pain at the base of the thumb. He attempted self-rehabilitation but subsequently caused a TFCC injury on the ulnar side of the wrist. This condition has since improved with the use of a brace, as prescribed by a physician. Despite undergoing hand therapy for the past 2 to 3 months, there has been minimal improvement. The pain is predominantly triggered by activities such as using a computer mouse and playing video games.    Physical Exam  The patient's fingers, wrist, and forearm have full range of motion. There is no tenderness to palpation at the thumb CMC joint, the radial styloid, the MCP joint, the A1 pulley, or the radiocarpal joint. No pain is observed at the TFCC fovea or the DRUJ. The ECU within the groove is stable with no tenderness to palpation. The ECU Synergy test is negative. The axial grind test of the thumb CMC joint is negative. The Finkelstein test is negative with no tenderness to palpation over the radial styloid.    Imaging/NCS    right Hand XR: AP, Lateral, Oblique and Thumb CMC joint     Clinical Indication:  1. Right hand pain           Report: AP, lateral, oblique and thumb CMC joint hand

## 2025-04-23 ENCOUNTER — TREATMENT (OUTPATIENT)
Age: 35
End: 2025-04-23

## 2025-04-23 DIAGNOSIS — M25.531 RIGHT WRIST PAIN: Primary | ICD-10-CM

## 2025-04-23 DIAGNOSIS — R29.898 DECREASED GRIP STRENGTH: ICD-10-CM

## 2025-04-25 ENCOUNTER — TELEPHONE (OUTPATIENT)
Dept: ORTHOPEDIC SURGERY | Age: 35
End: 2025-04-25

## 2025-04-25 NOTE — TELEPHONE ENCOUNTER
MRI RIGHT WRIST APPROVAL:    Member   Name: SE MUNOZ   Gender: Male   YOB: 1990   Member ID: [id number]   Health Plan: [id number] [employer] [facility]ly [facility] Silver - EX - HMO -    Spoken Language: ENGLISH   Written Language: ENGLISH   Referring Physician   Name: TEJINDER DE LA ROSA   Address: [address] [place] [address]   Phone: [phone]   Tax ID:    UPIN:    Specialty: Orthopedics     Rendering Provider   Name: NILES [facility]   Address: [address] [place] [address]   Phone: [phone]   Tax ID: [id number]                 Case   Case Description: Wrist MRI (right) Request ID:  Tracking: [id number]  [id number]         Request Date: 04/22/2025 01:40 PM Status: Approved   Entry Method: RadMD Validity Dates: 4/22/2025-5/22/2025      ICD10: M79.641  Contact Name: hunter luis  (Referring Provider)   Initial Determination Date: 04/23/2025 05:27 AM Email: [email or web address]   Final Determination Date: 04/23/2025 05:27 AM       Please be advised that all data was current as of Friday, April 25, 2025 at 8:12 AM [facility]   Radiology   Date of Service: 5/7/2025   Update       Expedited: No   Extension: No   CPT4: 54254 Billable Codes   Clinical Rcvd: 4/22/2025 - Clinical information received via fax or upload

## 2025-05-14 ENCOUNTER — TREATMENT (OUTPATIENT)
Age: 35
End: 2025-05-14
Payer: COMMERCIAL

## 2025-05-14 ENCOUNTER — OFFICE VISIT (OUTPATIENT)
Age: 35
End: 2025-05-14
Payer: COMMERCIAL

## 2025-05-14 DIAGNOSIS — M25.531 RIGHT WRIST PAIN: Primary | ICD-10-CM

## 2025-05-14 DIAGNOSIS — M19.90 INFLAMMATORY ARTHROPATHY: ICD-10-CM

## 2025-05-14 DIAGNOSIS — M06.9 RHEUMATOID ARTHRITIS, INVOLVING UNSPECIFIED SITE, UNSPECIFIED WHETHER RHEUMATOID FACTOR PRESENT (HCC): ICD-10-CM

## 2025-05-14 DIAGNOSIS — M65.90 SYNOVITIS AND TENOSYNOVITIS: ICD-10-CM

## 2025-05-14 DIAGNOSIS — R29.898 DECREASED GRIP STRENGTH: ICD-10-CM

## 2025-05-14 DIAGNOSIS — M65.90 SYNOVITIS AND TENOSYNOVITIS: Primary | ICD-10-CM

## 2025-05-14 LAB
ALBUMIN SERPL-MCNC: 4.5 G/DL (ref 3.5–5)
ALBUMIN/GLOB SERPL: 1.6 (ref 1–1.9)
ALP SERPL-CCNC: 65 U/L (ref 40–129)
ALT SERPL-CCNC: 38 U/L (ref 8–55)
ANION GAP SERPL CALC-SCNC: 11 MMOL/L (ref 7–16)
AST SERPL-CCNC: 24 U/L (ref 15–37)
BASOPHILS # BLD: 0.05 K/UL (ref 0–0.2)
BASOPHILS NFR BLD: 1 % (ref 0–2)
BILIRUB SERPL-MCNC: 0.5 MG/DL (ref 0–1.2)
BUN SERPL-MCNC: 12 MG/DL (ref 6–23)
CALCIUM SERPL-MCNC: 9.9 MG/DL (ref 8.8–10.2)
CHLORIDE SERPL-SCNC: 101 MMOL/L (ref 98–107)
CO2 SERPL-SCNC: 28 MMOL/L (ref 20–29)
CREAT SERPL-MCNC: 0.82 MG/DL (ref 0.8–1.3)
CRP SERPL-MCNC: <0.3 MG/DL (ref 0–0.4)
DIFFERENTIAL METHOD BLD: NORMAL
EOSINOPHIL # BLD: 0.09 K/UL (ref 0–0.8)
EOSINOPHIL NFR BLD: 1.8 % (ref 0.5–7.8)
ERYTHROCYTE [DISTWIDTH] IN BLOOD BY AUTOMATED COUNT: 12.1 % (ref 11.9–14.6)
ERYTHROCYTE [SEDIMENTATION RATE] IN BLOOD: <1 MM/HR (ref 0–20)
GLOBULIN SER CALC-MCNC: 2.8 G/DL (ref 2.3–3.5)
GLUCOSE SERPL-MCNC: 103 MG/DL (ref 70–99)
HCT VFR BLD AUTO: 47.2 % (ref 41.1–50.3)
HGB BLD-MCNC: 16.4 G/DL (ref 13.6–17.2)
IMM GRANULOCYTES # BLD AUTO: 0.01 K/UL (ref 0–0.5)
IMM GRANULOCYTES NFR BLD AUTO: 0.2 % (ref 0–5)
LYMPHOCYTES # BLD: 1.45 K/UL (ref 0.5–4.6)
LYMPHOCYTES NFR BLD: 29.3 % (ref 13–44)
MCH RBC QN AUTO: 31.4 PG (ref 26.1–32.9)
MCHC RBC AUTO-ENTMCNC: 34.7 G/DL (ref 31.4–35)
MCV RBC AUTO: 90.2 FL (ref 82–102)
MONOCYTES # BLD: 0.42 K/UL (ref 0.1–1.3)
MONOCYTES NFR BLD: 8.5 % (ref 4–12)
NEUTS SEG # BLD: 2.93 K/UL (ref 1.7–8.2)
NEUTS SEG NFR BLD: 59.2 % (ref 43–78)
NRBC # BLD: 0 K/UL (ref 0–0.2)
PLATELET # BLD AUTO: 233 K/UL (ref 150–450)
PMV BLD AUTO: 10.4 FL (ref 9.4–12.3)
POTASSIUM SERPL-SCNC: 4.3 MMOL/L (ref 3.5–5.1)
PROT SERPL-MCNC: 7.3 G/DL (ref 6.3–8.2)
RBC # BLD AUTO: 5.23 M/UL (ref 4.23–5.6)
SODIUM SERPL-SCNC: 140 MMOL/L (ref 136–145)
URATE SERPL-MCNC: 3.5 MG/DL (ref 3.9–8.2)
WBC # BLD AUTO: 5 K/UL (ref 4.3–11.1)

## 2025-05-14 PROCEDURE — 97035 APP MDLTY 1+ULTRASOUND EA 15: CPT | Performed by: OCCUPATIONAL THERAPIST

## 2025-05-14 PROCEDURE — 99214 OFFICE O/P EST MOD 30 MIN: CPT | Performed by: ORTHOPAEDIC SURGERY

## 2025-05-14 PROCEDURE — 97110 THERAPEUTIC EXERCISES: CPT | Performed by: OCCUPATIONAL THERAPIST

## 2025-05-14 PROCEDURE — 97140 MANUAL THERAPY 1/> REGIONS: CPT | Performed by: OCCUPATIONAL THERAPIST

## 2025-05-14 NOTE — PROGRESS NOTES
[facility]  [address] [facility] [address]  Dept: [phone]   Time/Billing     Payor: Payor: [facility] /  /  /  Billing pattern: Government- total time    Approved Visits:  Episode visit count:  10     CPT /   Modifier needed: No   UNITS Treatment Time (1:1)  Total Time    (in office)          Therapeutic Exercise 50546  Manual Therapy 80578  Fluido Therapy/ Whirlpool 60369  Paraffin 68674  Ultrasound 52604 1 15     1 15     -  -  1     8    Totals   Charge Capture 4 38 minutes 40 minutes       Occupational Therapy Discharge     Referring MD: Grzegorz Herrera APRN - *    Diagnosis:     ICD-10-CM    1. Right wrist pain  M25.531       2. Decreased  strength  R29.898         Medical Dx:    R thumb/wrist tenosynovitis   Date of referral: 2/5/25      Therapy precautions: None    History of injury/onset : Reports approx 4 to 5 years ago fell off approx 5 foot ledge and landed on his hands.  Reports was casted for 3 months after due to torn TFCC.  Reports he overexercised after this cast was removed, and also due to spending several hours per day nahid and working on his computer, has developed Right thumb and wrist pain  Preferred Name: Vick    SUBJECTIVE     Current Symptoms/Chief complaints:   Chief Complaint   Patient presents with    Hand Pain     Reports he declined offer for injection this date and will get RA work up.    OBJECTIVE     Functional Outcome Measures: Quick Dash  29 score=   40 % functional deficit at IE     Strength (psi): RIGHT  IE LEFT  IE RIGHT 5/14/25     Position II elbow flex   50 75 76.4    Lat Pinch: 19 21 22    2pt pinch: 14 16 15    3pt pinch: 13 17 19        Treatment:    Manual Therapy (79086): Addressing soft tissue/joint restrictions and swelling of  intrinsics and extrinsics:    STM to the palm and wrist area in preparation for PROM and tissue lengthening of the wrist/forearm components  Joint mobilizations to wrist and thumb ,

## 2025-05-14 NOTE — PROGRESS NOTES
Orthopedic Hand Surgery Note    Vick Held  1990  male    History of Present Illness  The patient is here for reevaluation of right thumb base pain. He continues to have pain, especially with extension of the thumb. To recap his history, he had a fall about 4 years ago which triggered all his issues. Initially, he had a TFCC tear that improved with braces.    Physical Exam  There is tenderness to palpation of the right thumb CMC joint. There is very minimal tenderness to palpation of the TFCC fovea. No significant tenderness to palpation of the wrist.    Imaging/NCS    MRI of the right wrist and thumb base was reviewed with the patient, this demonstrates small effusion at the thumb CMC joint, effusion of the pisotriquetral joint with incongruity.  Dorsal subluxation of the capitate upon the lunate concerning for instability, slight extension posture of the lunate and scaphoid    Visit Diagnoses         Codes      Synovitis and tenosynovitis    -  Primary M65.90      Inflammatory arthropathy     M19.90      Rheumatoid arthritis, involving unspecified site, unspecified whether rheumatoid factor present ([facility])     M06.9          Assessment & Plan  1. Right thumb base pain.  The MRI demonstrates small joint effusions on multiple joints throughout the hand and wrist, including the thumb CMC joint and the mid-carpal joint. There is also dorsal subluxation of the capitate upon the lunate and an effusion of the pisotriquetral joint with incongruity of that joint. These findings, in conjunction with his symptoms, are concerning for hyperlaxity or inflammatory arthropathy. A rheumatoid workup will be obtained. A thumb CMC joint steroid injection was offered today, but he declined and would like to do this at the next visit.    Follow-up  The patient will follow up in 1 to 2 weeks to review the lab work.    Yamilka Alvarez Jr, MD, PhD  Orthopaedic Surgery  05/14/25  1:46 PM

## 2025-05-15 LAB
ANA SER QL: NEGATIVE
CCP IGA+IGG SERPL IA-ACNC: 10 UNITS (ref 0–19)
RHEUMATOID FACT SER QL LA: NEGATIVE

## 2025-05-21 LAB — HLA-B27 QL NAA+PROBE: NEGATIVE

## 2025-06-25 ENCOUNTER — RESULTS FOLLOW-UP (OUTPATIENT)
Dept: ORTHOPEDIC SURGERY | Age: 35
End: 2025-06-25

## 2025-08-27 DIAGNOSIS — G89.29 CHRONIC PELVIC PAIN IN MALE: ICD-10-CM

## 2025-08-27 DIAGNOSIS — R10.2 CHRONIC PELVIC PAIN IN MALE: ICD-10-CM
